# Patient Record
Sex: MALE | Race: WHITE | NOT HISPANIC OR LATINO | Employment: STUDENT | URBAN - METROPOLITAN AREA
[De-identification: names, ages, dates, MRNs, and addresses within clinical notes are randomized per-mention and may not be internally consistent; named-entity substitution may affect disease eponyms.]

---

## 2017-03-17 ENCOUNTER — GENERIC CONVERSION - ENCOUNTER (OUTPATIENT)
Dept: OTHER | Facility: OTHER | Age: 22
End: 2017-03-17

## 2018-01-09 NOTE — PROGRESS NOTES
Assessment    1  Cigar smoker (305 1) (F17 290)   2  Encounter for preventive health examination (V70 0) (Z00 00)   3  Type I diabetes mellitus, uncontrolled (250 03) (E10 65)    Plan  Type 1 diabetes mellitus without complication    · *VB - Foot Exam; Status:Complete - Retrospective By Protocol Authorization;   Done:  [id number] 06:11PM    Discussion/Summary    1  Health maintenance-recommend regular cleaning around the foreskin after retracting regularly  2  Type 1 diabetes/uncontrolled/without known complication-patient has had blood work ordered by endocrinology  His last hemoglobin A1c was 13 7  He was advised to continue regular followup with his specialist regarding insulins pump therapy  We further discussed the complications of uncontrolled diabetes  Recommend a copy of his blood work be sent here as well  Chief Complaint  pt is here for a routine physical he is diabetic   cd      History of Present Illness  HPI: This is a 16-year-old gentleman that presents to the office accompanied by his mother  He has a history of type 1 diabetes and has not been seen in this office for a physical for quite some time  He does continue to follow with endocrinology who manages his insulins pump with NovoLog  His mother states that he has had an A1c checked in our office recently and it was 13 7  He has been uncontrolled for quite some time  He has orders for blood tests including CMP, microalbumin, lipid panel, and thyroid testing  He has not been following with a podiatrist or ophthalmology  Review of Systems    Constitutional: no fever, not feeling poorly, no chills and not feeling tired  Eyes: no eyesight problems and no purulent discharge from the eyes  ENT: no earache, no nosebleeds, no hearing loss and no nasal discharge  Cardiovascular: the heart rate was not slow, no chest pain and the heart rate was not fast    Gastrointestinal: no abdominal pain, no nausea and no diarrhea     Musculoskeletal: no arthralgias, no joint swelling, no myalgias and no joint stiffness  Integumentary: no rashes  Hematologic/Lymphatic: no swollen glands  Active Problems    1  Abrasion Of Lower Leg (916 0)   2  Acute foot pain, right (729 5) (M79 671)   3  Incontinence (788 30) (R32)   4  Mood Disorder Of Unknown (Axis III) Etiology (293 83)   5  Oppositional defiant disorder (313 81) (F91 3)   6  Right foot pain (729 5) (M79 671)   7  Second degree sunburn (692 76) (L55 1)   8  Type 1 diabetes mellitus without complication (488 31) (R01 7)   9  Type I diabetes mellitus, uncontrolled (250 03) (E10 65)    Past Medical History    · Denied: History of mental disorder    Family History  Mother    · Family history of Epilepsy And Recurrent Seizures (V17 2)  Family History    · Family history of Acute Myocardial Infarction (V17 3)   · Family history of Anxiety (Symptom)   · Family history of Arthritis (V17 7)   · Family history of Emphysema   · Family history of Hypertension (V17 49)   · Family history of Kidney Cancer (V16 51)   · Family history of Stroke Syndrome (V17 1)    Social History    · Marital History - Single   · Never Drank Alcohol    Current Meds   1  Glucagon Emergency 1 MG Injection Kit; Therapy: 27HWE3180 to (Last [facility])  Requested for: 26Apr2011 Ordered   2  Ketostix In [facility]; Therapy: 97RFS6228 to (Last [facility])  Requested for: 19Ota5797 Ordered   3  NovoLOG 100 UNIT/ML Subcutaneous Solution; Therapy: 74Cdm0561 to (Last Rx:47Joz9227)  Requested for: 47Vpl7357 Ordered   4  OneTouch Ultra Blue In [employer]; Therapy: 18Kww4582 to (Last Rx:65Ysq1784)  Requested for: 98Kge7994 Ordered   5  OneTouch UltraSoft Lancets Miscellaneous; Therapy: 54AVE6554 to (Last [facility])  Requested for: 07Dqm1392 Ordered   6  Oxybutynin Chloride ER 10 MG Oral Tablet Extended Release 24 Hour; Take 1 tablet   daily; Therapy: 28BTW7710 to (Last Rx:24Jun2014)  Requested for: 57DZZ2422 Ordered   7  TraZODone HCl - 50 MG Oral Tablet; Therapy: 51SLF7215 to (Last Rx:81Pow1513)  Requested for: 33QQH1560 Ordered   8  Vitamin D (Ergocalciferol) 78753 UNIT Oral Capsule; Therapy: 28QQG5373 to (Evaluate:51Ubo2772) Recorded    Allergies    1  No Known Drug Allergies    Vitals   Recorded: 41VWD3836 07:80YN   Systolic 927, LUE, Sitting   Diastolic 60, LUE, Sitting   Heart Rate 74, L Radial   Pulse Quality Regular   Height 5 ft 2 in   Weight 132 lb    BMI Calculated 24 14   BSA Calculated 1 6     Physical Exam    Constitutional   General appearance: No acute distress, well appearing and well nourished  Head and Face   Head and face: Normal     Palpation of the face and sinuses: No sinus tenderness  Eyes   Conjunctiva and lids: No erythema, swelling or discharge  Pupils and irises: Equal, round, reactive to light  Ophthalmoscopic examination: Normal fundi and optic discs  Ears, Nose, Mouth, and Throat   External inspection of ears and nose: Normal     Otoscopic examination: Tympanic membranes translucent with normal light reflex  Canals patent without erythema  Hearing: Normal     Nasal mucosa, septum, and turbinates: Normal without edema or erythema  Lips, teeth, and gums: Normal, good dentition  Neck   Neck: Supple, symmetric, trachea midline, no masses  Thyroid: Normal, no thyromegaly  Pulmonary   Percussion of chest: Normal     Palpation of chest: Normal     Cardiovascular   Auscultation of heart: Normal rate and rhythm, normal S1 and S2, no murmurs  Carotid pulses: 2+ bilaterally  Abdominal aorta: Normal     Femoral pulses: 2+ bilaterally  Pedal pulses: 2+ bilaterally  Chest   Breasts: Normal, no dimpling or skin changes appreciated  Palpation of breasts and axillae: Normal, no masses palpated  Abdomen   Abdomen: Non-tender, no masses  Liver and spleen: No hepatomegaly or splenomegaly  Examination for hernias: No hernias appreciated      Genitourinary   Scrotal contents: Normal testes, no masses  Penis: Normal, no lesions  Small amount of smegma around the corona  Lymphatic   Palpation of lymph nodes in neck: No lymphadenopathy  Musculoskeletal   Gait and station: Normal     Inspection/palpation of digits and nails: Normal without clubbing or cyanosis  Inspection/palpation of joints, bones, and muscles: Normal     Range of motion: Normal     Stability: Normal     Muscle strength/tone: Normal     Skin   Skin and subcutaneous tissue: Normal without rashes or lesions  Palpation of skin and subcutaneous tissue: Normal turgor  Neurologic   Cortical function: Normal mental status  Reflexes: 2+ and symmetric  Sensation: No sensory loss  Psychiatric   Judgment and insight: Normal     Orientation to person, place and time: Normal     Recent and remote memory: Intact  Mood and affect: Normal        Results/Data  [employer]Cal[employer] - Health Calculators 80ZCO2518 06:11PM User, [employer]     Test Name Result Flag Reference   SBIRT Screen - Tobacco Screening Result Positive       PHQ-2 Adult Depression Screening 57Arp5258 06:11PM User, Ahs     Test Name Result Flag Reference   PHQ-2 Adult Depression Score 0     Over the last two weeks, how often have you been bothered by any of the following problems? Little interest or pleasure in doing things: Not at all - 0  Feeling down, depressed, or hopeless: Not at all - 0   PHQ-2 Adult Depression Screening Negative       *VB - Foot Exam 37Siz2724 06:11PM Jerrod Nguyen     Test Name Result Flag Reference   FOOT EXAM [id number]         Signatures   Electronically signed by : LATASHA Barr;  Aug 29 2016  6:28PM EST                       (Author)    Electronically signed by : Amanda Bermudez DO; Aug 30 2016  7:04AM EST

## 2021-10-27 ENCOUNTER — VBI (OUTPATIENT)
Dept: ADMINISTRATIVE | Facility: OTHER | Age: 26
End: 2021-10-27